# Patient Record
Sex: FEMALE | Race: WHITE | NOT HISPANIC OR LATINO
[De-identification: names, ages, dates, MRNs, and addresses within clinical notes are randomized per-mention and may not be internally consistent; named-entity substitution may affect disease eponyms.]

---

## 2021-08-12 PROBLEM — Z00.00 ENCOUNTER FOR PREVENTIVE HEALTH EXAMINATION: Status: ACTIVE | Noted: 2021-08-12

## 2021-08-13 ENCOUNTER — APPOINTMENT (OUTPATIENT)
Dept: ORTHOPEDIC SURGERY | Facility: CLINIC | Age: 56
End: 2021-08-13
Payer: COMMERCIAL

## 2021-08-13 VITALS — HEIGHT: 58.5 IN | WEIGHT: 123 LBS | BODY MASS INDEX: 25.13 KG/M2

## 2021-08-13 DIAGNOSIS — M81.0 AGE-RELATED OSTEOPOROSIS W/OUT CURRENT PATHOLOGICAL FRACTURE: ICD-10-CM

## 2021-08-13 PROCEDURE — 99213 OFFICE O/P EST LOW 20 MIN: CPT

## 2021-08-13 NOTE — HISTORY OF PRESENT ILLNESS
[de-identified] : Patient is an established patient seen approximately 3 years ago who is presenting for discussion in regards to her recent DEXA scan presenting for a second opinion.  She reports she had a DEXA scan by her rheumatologist and has report available and she wanted to discuss considerations expectations and treatment options.\par \par

## 2021-08-13 NOTE — PHYSICAL EXAM
[de-identified] : Examination deferred this patient is presenting for discussion in regards to her DEXA scan and treatment options.  Discussion was had with the patient for 15 minutes face-to-face in regards to her results.  Results reveal a T score and the spine of -3.5, a -2.5 in the hip, and other areas -1.3 and -2.  Patient states she is currently only taking vitamin D and is not on any actual calcium supplementation.  Her rheumatologist had recommended starting Prolia and discussion was had with the patient was to Prolia versus Fosamx or similar.  Discussed as well general risk profile for sustaining a compression fracture or hip fracture as well as exercise activities recommended.  Patient's questions were answered and she will consider her options and I deferred her to follow up with her primary doctor rheumatologist in regards to ultimate decision in regards to osteoporosis medication patient agrees with that plan

## 2024-03-29 ENCOUNTER — NON-APPOINTMENT (OUTPATIENT)
Age: 59
End: 2024-03-29

## 2024-04-17 ENCOUNTER — NON-APPOINTMENT (OUTPATIENT)
Age: 59
End: 2024-04-17

## 2025-03-11 ENCOUNTER — NON-APPOINTMENT (OUTPATIENT)
Age: 60
End: 2025-03-11